# Patient Record
(demographics unavailable — no encounter records)

---

## 2025-06-24 NOTE — HISTORY OF PRESENT ILLNESS
[FreeTextEntry1] : 53 yo female with DM type 2, obesity, and hypertension who presents today for cardiac evaluation of recurrent exertional SOB/fatigue when just walking. She does not exercise regularly. Patient denies chest pain, palpitations, syncope, edema, melena, hematochezia, or hematemesis. She is also requesting to change her nadolol to different medication as it is not covered by her insurance.

## 2025-06-24 NOTE — ASSESSMENT
[FreeTextEntry1] : 51 yo female with DM type 2, obesity, hypertension, and recurrent exertional SOB/fatigue with mild physical activity. ECG today demonstrated sinus rhythm.  Given cardiac risk factors and recurrent exertional SOB, will perform treadmill stress ECG for ischemic evaluation/risk stratification. Will perform echocardiogram to assess ventricular function and structural heart disease. After review of test results, will determine if further cardiac work-up or intervention is clinically indicated. Will check labs (CBC, CMP).  Due to insurance coverage of medication, will change nadolol to metoprolol succinate 50 mg po daily for BP control. Patient was instructed to monitor BP with this medication change and call if BP readings become persistently elevated.  Given DM type 2, will check lipid panel.

## 2025-06-24 NOTE — ASSESSMENT
[FreeTextEntry1] : 53 yo female with DM type 2, obesity, hypertension, and recurrent exertional SOB/fatigue with mild physical activity. ECG today demonstrated sinus rhythm.  Given cardiac risk factors and recurrent exertional SOB, will perform treadmill stress ECG for ischemic evaluation/risk stratification. Will perform echocardiogram to assess ventricular function and structural heart disease. After review of test results, will determine if further cardiac work-up or intervention is clinically indicated. Will check labs (CBC, CMP).  Due to insurance coverage of medication, will change nadolol to metoprolol succinate 50 mg po daily for BP control. Patient was instructed to monitor BP with this medication change and call if BP readings become persistently elevated.  Given DM type 2, will check lipid panel.

## 2025-06-24 NOTE — HISTORY OF PRESENT ILLNESS
[FreeTextEntry1] : 51 yo female with DM type 2, obesity, and hypertension who presents today for cardiac evaluation of recurrent exertional SOB/fatigue when just walking. She does not exercise regularly. Patient denies chest pain, palpitations, syncope, edema, melena, hematochezia, or hematemesis. She is also requesting to change her nadolol to different medication as it is not covered by her insurance.